# Patient Record
(demographics unavailable — no encounter records)

---

## 2024-12-03 NOTE — ASSESSMENT
[FreeTextEntry1] : EKG shows normal sinus rhythm/sinus bradycardia at a rate of 50; no acute changes;  In summary, this 77-year-old woman has a history for hypertension and hyperlipidemia with stable cardiac pattern at this time and stable blood pressure on current medical regimen.  She has been largely asymptomatic from the cardiac standpoint;    Plan:  Agree with continuing current medical regimen;  No additional cardiac workup indicated at this time;  Continue modest physical activity;      Regular checkups and laboratory blood test with primary care encouraged;  Return to office within 6 months or as needed;

## 2024-12-03 NOTE — REASON FOR VISIT
[FreeTextEntry1] : ALEXIS BARBOSA is being seen for arrhythmia/ECG abnormalities, structural heart and valve disease and hypertension.   The patient is a 77-year-old white female with a known history for hypertension, mild valvular insufficiency and occasional palpitations of the chest with underlying asthma/COPD, and pulmonary hypertension;  She returns to the office today for general cardiac checkup, and to discuss results of recent cardiovascular testing;  Fortunately, patient reports she's been feeling very well and has had no significant chest pain, shortness of breath, palpitations or dizzy;

## 2024-12-03 NOTE — PHYSICAL EXAM
[Well Developed] : well developed [Well Nourished] : well nourished [No Acute Distress] : no acute distress [Normal Conjunctiva] : normal conjunctiva [Normal Venous Pressure] : normal venous pressure [No Carotid Bruit] : no carotid bruit [Carotid Bruit] : carotid bruit [Normal S1, S2] : normal S1, S2 [No Murmur] : no murmur [No Rub] : no rub [No Gallop] : no gallop [Murmur] : murmur [Clear Lung Fields] : clear lung fields [Good Air Entry] : good air entry [No Respiratory Distress] : no respiratory distress  [Soft] : abdomen soft [Non Tender] : non-tender [No Masses/organomegaly] : no masses/organomegaly [Normal Bowel Sounds] : normal bowel sounds [Normal Gait] : normal gait [No Edema] : no edema [No Cyanosis] : no cyanosis [No Clubbing] : no clubbing [No Varicosities] : no varicosities [No Rash] : no rash [No Skin Lesions] : no skin lesions [Moves all extremities] : moves all extremities [No Focal Deficits] : no focal deficits [Normal Speech] : normal speech [Alert and Oriented] : alert and oriented [Normal memory] : normal memory [de-identified] : right [de-identified] : Grade I/VI systolic murmur

## 2024-12-03 NOTE — HISTORY OF PRESENT ILLNESS
[FreeTextEntry1] : Patient is accompanied with her  today who is also a patient.  They report they will be traveling to [place] for much of the winter and to return in the early spring;  Carotid duplex study from November 26, 2024 demonstrated bilateral mild, heterogeneous plaquing in the range of 20 to 49% without significant obstructive flow; (essentially unchanged from previous study from previous study from December 2022);    Pharmacologic myocardial perfusion stress test from November 30, 2021 demonstrated no symptoms of chest pain. EKG showed new right bundle branch block pattern-of questionable significance; Myocardial perfusion imaging showed normal perfusion without any evidence of ischemia; hyperdynamic LVEF at 79%;  Transthoracic echocardiogram from December 6, 2022 shows preserved left ventricular size and systolic function with normal EF of 60%.  There was mild diastolic dysfunction noted.  The left atrium and right atrium are mildly dilated.  There is some mild mitral tricuspid and pulmonic insufficiency with mild elevated PA pressures estimated.  No pericardial effusion;  She continues to eat healthier and exercising regularly without complication and has lost some additional weight as well;

## 2025-05-06 NOTE — DATA REVIEWED
[FreeTextEntry1] : Spirometry pre and postbronchodilator therapy shows normal flow rates.  FEV1 is 1.89 L which is 99% predicted.  FVC is 2.20 L which is 86% predicted.  FEV1/FVC ratio is 86%.  There is no significant bronchodilator response.

## 2025-05-06 NOTE — PHYSICAL EXAM
[No Acute Distress] : no acute distress [Well Nourished] : well nourished [Well Developed] : well developed [Well-Appearing] : well-appearing [Normal Sclera/Conjunctiva] : normal sclera/conjunctiva [PERRL] : pupils equal round and reactive to light [EOMI] : extraocular movements intact [Normal Outer Ear/Nose] : the outer ears and nose were normal in appearance [Normal Oropharynx] : the oropharynx was normal [No JVD] : no jugular venous distention [No Lymphadenopathy] : no lymphadenopathy [Supple] : supple [Thyroid Normal, No Nodules] : the thyroid was normal and there were no nodules present [No Respiratory Distress] : no respiratory distress  [No Accessory Muscle Use] : no accessory muscle use [Clear to Auscultation] : lungs were clear to auscultation bilaterally [Normal Rate] : normal rate  [Regular Rhythm] : with a regular rhythm [Normal S1, S2] : normal S1 and S2 [No Carotid Bruits] : no carotid bruits [No Abdominal Bruit] : a ~M bruit was not heard ~T in the abdomen [No Varicosities] : no varicosities [Pedal Pulses Present] : the pedal pulses are present [No Edema] : there was no peripheral edema [No Palpable Aorta] : no palpable aorta [No Extremity Clubbing/Cyanosis] : no extremity clubbing/cyanosis [Soft] : abdomen soft [Non Tender] : non-tender [Non-distended] : non-distended [No Masses] : no abdominal mass palpated [No HSM] : no HSM [Normal Bowel Sounds] : normal bowel sounds [Normal Posterior Cervical Nodes] : no posterior cervical lymphadenopathy [Normal Anterior Cervical Nodes] : no anterior cervical lymphadenopathy [No CVA Tenderness] : no CVA  tenderness [No Spinal Tenderness] : no spinal tenderness [No Joint Swelling] : no joint swelling [Grossly Normal Strength/Tone] : grossly normal strength/tone [No Rash] : no rash [Coordination Grossly Intact] : coordination grossly intact [No Focal Deficits] : no focal deficits [Normal Gait] : normal gait [Deep Tendon Reflexes (DTR)] : deep tendon reflexes were 2+ and symmetric [Normal Affect] : the affect was normal [Normal Insight/Judgement] : insight and judgment were intact [de-identified] : 2/6 systolic murmur

## 2025-05-06 NOTE — PLAN
[FreeTextEntry1] : Ms. Jj presents for follow-up evaluation.  1.  Patient does have elevated blood pressure.  Blood pressure was 170/70 taken by me in the right arm.  She is currently on amlodipine 5 mg daily, Edarbi chlor 40/12.5 mg daily and Nebivolol 10 mg daily.  Amlodipine will be increased to 10 mg.  The patient has a follow-up appointment with her cardiologist,Dr. Kalyn Zacarias in 2 weeks.  I have told the patient to keep a diary of her blood pressure medication and bring her blood pressure machine to her visit. 2.  Check CBC, CMP, hemoglobin A1c, iron level, lipid profile, TSH, vitamin D and urinalysis. 3.  Rheumatology follow-up with . 4.  Patient has been given prescription for mammogram. 5.  I have recommended that she receive the RSV vaccine and the Shingrix vaccine. 6.  Follow-up in 6 months.

## 2025-05-06 NOTE — HISTORY OF PRESENT ILLNESS
[FreeTextEntry1] : Follow-up [de-identified] : Ms. Jj presents for a follow-up evaluation.  She recently returned from [place]. She is a 78-year-old female with a history of COPD, hyperlipidemia, hypertension, hypothyroidism, type 2 diabetes, pulmonary hypertension, Raynaud's syndrome and rheumatoid factor positive. Patient is feeling well.  She denies any chest pain, shortness of breath or palpitations. Ms. Jj continues on Symbicort 160/4.5 mcg 2 puffs twice daily. Minimal joint pain.

## 2025-05-20 NOTE — ASSESSMENT
[FreeTextEntry1] : EKG shows normal sinus rhythm/sinus bradycardia at a rate of 53 LAE pattern; no acute changes;  In summary, this 78-year-old woman has a history for hypertension and hyperlipidemia with stable cardiac pattern at this time and stable blood pressure on current medical regimen.  There is also a history of pulmonary hypertension which over the past 7 to 8 years has progressively worsened yet with relatively no patient's symptoms and no definite etiology for this;  She has been largely asymptomatic from the cardiac standpoint; otherwise appears cardiac and hemodynamically stable with recent systolic hypertension briefly noted but now under control with enhanced amlodipine at 10 mg daily;    Plan:  Patient instructed to continue amlodipine at 10 mg daily for the next 1 to 2 weeks and if blood pressure becomes under excellent control could try weaning down to 5 mg daily and continue to monitor blood pressure although while being extra careful with sodium in the diet and watching her weight as well as doing some exercise;  Have discussed with patient recommendation as well to undergo nuclear stress test sometime prior to the next visit to rule out any significant coronary artery disease and clear patient to increase her exercise tolerance;  Agree with continuing workup for pulmonary hypertension causes and consider pulmonary evaluation with pulmonary hypertension specialist sometime in the near future  Regular checkups and laboratory blood test with primary care encouraged;  Return to office within 6 months or as needed;

## 2025-05-20 NOTE — PHYSICAL EXAM
[Well Developed] : well developed [Well Nourished] : well nourished [No Acute Distress] : no acute distress [Normal Conjunctiva] : normal conjunctiva [Normal Venous Pressure] : normal venous pressure [No Carotid Bruit] : no carotid bruit [Carotid Bruit] : carotid bruit [Normal S1, S2] : normal S1, S2 [No Murmur] : no murmur [No Rub] : no rub [No Gallop] : no gallop [Murmur] : murmur [Clear Lung Fields] : clear lung fields [Good Air Entry] : good air entry [No Respiratory Distress] : no respiratory distress  [Soft] : abdomen soft [Non Tender] : non-tender [No Masses/organomegaly] : no masses/organomegaly [Normal Bowel Sounds] : normal bowel sounds [Normal Gait] : normal gait [No Edema] : no edema [No Cyanosis] : no cyanosis [No Clubbing] : no clubbing [No Varicosities] : no varicosities [No Rash] : no rash [No Skin Lesions] : no skin lesions [Moves all extremities] : moves all extremities [No Focal Deficits] : no focal deficits [Normal Speech] : normal speech [Alert and Oriented] : alert and oriented [Normal memory] : normal memory [de-identified] : right [de-identified] : Grade I/VI systolic murmur

## 2025-05-20 NOTE — HISTORY OF PRESENT ILLNESS
[FreeTextEntry1] : Patient is accompanied with her  today who is also a patient.  They report they spent much of the winter in [place] and generally did well;  Just recently, patient was noted to have a jump in her systolic blood pressure to the 170s and was asked to increase the amlodipine to 10 mg daily.  Her blood pressure seems to have probably come back down to her usual readings and she is asking whether she needs to "stay on the elevated dosage";  Additionally, she is under evaluation with her endocrinologist for her history of pulmonary hypertension and apparent presence of rheumatologic disorders;   Her most recent transthoracic echocardiogram from November 26, 2024 showed preserved left ventricular size and systolic function with ejection fraction in the range of 55 to 60%.  There was noted "severe" left ventricular diastolic dysfunction of questionable significance.  The patient had mild enlarged left atrium.  Normal right atrial dimensions.  Calcification of the aortic valve which opens normally without stenosis.  There was mitral annular calcification noted and mild to moderate MR.  Moderate tricuspid insufficiency.  And increased pulmonary artery systolic pressures calculated in the range of 71 mmHg compatible with severe pulmonary hypertension.  There was trace or trivial pericardial effusion adjacent to the RV which did not seem hemodynamically significant; normal aortic dimensions.;   Carotid duplex study from November 26, 2024 demonstrated bilateral mild, heterogeneous plaquing in the range of 20 to 49% without significant obstructive flow; (essentially unchanged from previous study from previous study from December 2022);    Pharmacologic myocardial perfusion stress test from November 30, 2021 demonstrated no symptoms of chest pain. EKG showed new right bundle branch block pattern-of questionable significance; Myocardial perfusion imaging showed normal perfusion without any evidence of ischemia; hyperdynamic LVEF at 79%;  Transthoracic echocardiogram from December 6, 2022 shows preserved left ventricular size and systolic function with normal EF of 60%.  There was mild diastolic dysfunction noted.  The left atrium and right atrium are mildly dilated.  There is some mild mitral tricuspid and pulmonic insufficiency with mild-moderate elevated PA pressures estimated.  No pericardial effusion;  She continues to eat healthier and exercising regularly without complication and has lost some additional weight as well;

## 2025-05-20 NOTE — REASON FOR VISIT
[FreeTextEntry1] : ALEXIS BARBOSA is being seen for arrhythmia/ECG abnormalities, structural heart and valve disease and hypertension.   The patient is a 77-year-old white female with a known history for hypertension, mild valvular insufficiency and occasional palpitations of the chest with underlying asthma/COPD, and pulmonary hypertension;  She returns to the office today for general cardiac checkup;  She generally has been feeling well and has had no significant chest pain, shortness of breath, palpitations or dizzy;